# Patient Record
Sex: FEMALE | Race: WHITE | NOT HISPANIC OR LATINO
[De-identification: names, ages, dates, MRNs, and addresses within clinical notes are randomized per-mention and may not be internally consistent; named-entity substitution may affect disease eponyms.]

---

## 2019-08-26 ENCOUNTER — FORM ENCOUNTER (OUTPATIENT)
Age: 74
End: 2019-08-26

## 2019-09-10 ENCOUNTER — FORM ENCOUNTER (OUTPATIENT)
Age: 74
End: 2019-09-10

## 2020-09-22 ENCOUNTER — FORM ENCOUNTER (OUTPATIENT)
Age: 75
End: 2020-09-22

## 2021-09-03 PROBLEM — Z00.00 ENCOUNTER FOR PREVENTIVE HEALTH EXAMINATION: Status: ACTIVE | Noted: 2021-09-03

## 2021-09-24 DIAGNOSIS — Z80.1 FAMILY HISTORY OF MALIGNANT NEOPLASM OF TRACHEA, BRONCHUS AND LUNG: ICD-10-CM

## 2021-09-24 DIAGNOSIS — Z86.79 PERSONAL HISTORY OF OTHER DISEASES OF THE CIRCULATORY SYSTEM: ICD-10-CM

## 2021-09-24 DIAGNOSIS — Z80.3 FAMILY HISTORY OF MALIGNANT NEOPLASM OF BREAST: ICD-10-CM

## 2021-09-24 DIAGNOSIS — Z85.3 PERSONAL HISTORY OF MALIGNANT NEOPLASM OF BREAST: ICD-10-CM

## 2021-09-27 ENCOUNTER — NON-APPOINTMENT (OUTPATIENT)
Age: 76
End: 2021-09-27

## 2021-09-27 ENCOUNTER — APPOINTMENT (OUTPATIENT)
Dept: BREAST CENTER | Facility: CLINIC | Age: 76
End: 2021-09-27
Payer: MEDICARE

## 2021-09-27 VITALS
BODY MASS INDEX: 26.31 KG/M2 | HEIGHT: 60 IN | DIASTOLIC BLOOD PRESSURE: 88 MMHG | HEART RATE: 60 BPM | OXYGEN SATURATION: 98 % | WEIGHT: 134 LBS | SYSTOLIC BLOOD PRESSURE: 157 MMHG

## 2021-09-27 PROCEDURE — 99213 OFFICE O/P EST LOW 20 MIN: CPT

## 2021-09-27 NOTE — PHYSICAL EXAM
[Normocephalic] : normocephalic [Atraumatic] : atraumatic [EOMI] : extra ocular movement intact [Supple] : supple [No Supraclavicular Adenopathy] : no supraclavicular adenopathy [No Cervical Adenopathy] : no cervical adenopathy [Examined in the supine and seated position] : examined in the supine and seated position [No dominant masses] : no dominant masses in right breast  [No dominant masses] : no dominant masses left breast [Breast Mass Right Breast ___cm] : no masses [Breast Mass Left Breast ___cm] : no masses [No Axillary Lymphadenopathy] : no left axillary lymphadenopathy [No Edema] : no edema [No Rashes] : no rashes [No Ulceration] : no ulceration [de-identified] : On exam, the patient has obvious bilateral total mastectomies with implant reconstructions.  She has undergone bilateral nipple reconstructions and tattooing.  I cannot feel any suspicious densities over either implant.  She has no axillary, supraclavicular, or cervical adenopathy. [de-identified] : Status post total mastectomy with implant reconstruction with no evidence of recurrence. [de-identified] : Status post total mastectomy with implant reconstruction with no suspicious findings.

## 2021-09-27 NOTE — REASON FOR VISIT
[Follow-Up: _____] : a [unfilled] follow-up visit [FreeTextEntry1] : The patient comes in with a family history of breast cancer and a personal history of undergoing bilateral mastectomies with bilateral implant reconstructions in May 2012 for 4 mm moderately differentiated right breast cancer with 1 negative sentinel lymph node which was ER/[place] positive HER-2/donald negative making that a pathologic prognostic stage IA breast cancer.  The left mastectomy was prophylactic but she had an incidental retroareolar area of DCIS which was found in the nipple and it was taken.  She was placed on Arimidex for 5 years and comes in for routine yearly exams.

## 2021-09-27 NOTE — PAST MEDICAL HISTORY
[Menarche Age ____] : age at menarche was [unfilled] [Postmenopausal] : The patient is postmenopausal [Menopause Age____] : age at menopause was [unfilled] [Total Preg ___] : G[unfilled] [Live Births ___] : P[unfilled]  [Age At Live Birth ___] : Age at live birth: [unfilled] [History of Hormone Replacement Treatment] : has no history of hormone replacement treatment

## 2021-09-27 NOTE — HISTORY OF PRESENT ILLNESS
[FreeTextEntry1] : The patient is a 76-year-old G3, P3 postmenopausal white female of [place] descent.  She underwent menarche at age 11 and had her first child at age 22.  She underwent menopause in her 50s and never took any hormone replacement therapy.  She has a family history with her mother who had breast cancer at age 70 and then later developed metastatic disease at age 85.  Her sister developed breast cancer at age 62.  Her father had lung cancer at age 72.  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  This right breast cancer was a pathologic prognostic stage IA cancer.  She was initially planned to have a nipple sparing mastectomy on the left but was found to have an incidental DCIS on the left side on the retroareolar biopsy and the nipples were taken on both sides.  She had bilateral implant reconstructions and later nipple reconstructions and tattooing.  She had 1 negative axillary sentinel lymph node on the right.  She had nipple reconstructions performed in October 2012.  She received no adjuvant chemotherapy and took Arimidex for 5 years and comes in now for routine yearly follow-up.

## 2021-09-27 NOTE — ASSESSMENT
[FreeTextEntry1] : The patient is a 76-year-old G3, P3 postmenopausal white female of [place] descent.  She underwent menarche at age 11 and had her first child at age 22.  She underwent menopause in her 50s and never took any hormone replacement therapy.  She has a family history with her mother who had breast cancer at age 70 and then later developed metastatic disease at age 85.  Her sister developed breast cancer at age 62.  Her father had lung cancer at age 72.  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  This right breast cancer was a pathologic prognostic stage IA cancer.  She was initially planned to have a nipple sparing mastectomy on the left but was found to have an incidental DCIS on the left side on the retroareolar biopsy and the nipples were taken on both sides.  She had bilateral implant reconstructions and later nipple reconstructions and tattooing.  She had 1 negative axillary sentinel lymph node on the right.  She had nipple reconstructions performed in October 2012.  She received no adjuvant chemotherapy and took Arimidex for 5 years.  On exam today, I cannot feel any suspicious densities over either implant.   The patient was reassured and should follow-up again in 1 year in September 2022.

## 2022-10-06 ENCOUNTER — NON-APPOINTMENT (OUTPATIENT)
Age: 77
End: 2022-10-06

## 2022-10-06 ENCOUNTER — APPOINTMENT (OUTPATIENT)
Dept: BREAST CENTER | Facility: CLINIC | Age: 77
End: 2022-10-06

## 2022-10-06 VITALS
SYSTOLIC BLOOD PRESSURE: 146 MMHG | OXYGEN SATURATION: 99 % | BODY MASS INDEX: 26.37 KG/M2 | WEIGHT: 135 LBS | HEART RATE: 68 BPM | DIASTOLIC BLOOD PRESSURE: 77 MMHG

## 2022-10-06 PROCEDURE — 99213 OFFICE O/P EST LOW 20 MIN: CPT

## 2022-10-06 NOTE — REASON FOR VISIT
[Follow-Up: _____] : a [unfilled] follow-up visit [FreeTextEntry1] : The patient comes in with a family history of breast cancer and a personal history of undergoing bilateral mastectomies with bilateral implant reconstructions in May 2012 for 4 mm moderately differentiated right breast cancer with 1 negative sentinel lymph node which was ER/[place] positive HER-2/donald negative making that a pathologic prognostic stage IA breast cancer.  The left mastectomy was prophylactic but she had an incidental retroareolar area of DCIS which was found in the nipple and it was taken.  She was placed on Arimidex for 5 years and comes in for routine yearly exams.

## 2022-10-06 NOTE — ASSESSMENT
[FreeTextEntry1] : The patient is a 77-year-old G3, P3 postmenopausal white female of [place] descent.  She underwent menarche at age 11 and had her first child at age 22.  She underwent menopause in her 50s and never took any hormone replacement therapy.  She has a family history with her mother who had breast cancer at age 70 and then later developed metastatic disease at age 85.  Her sister developed breast cancer at age 62.  Her father had lung cancer at age 72.  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  This right breast cancer was a pathologic prognostic stage IA cancer.  She was initially planned to have a nipple sparing mastectomy on the left but was found to have an incidental DCIS on the left side on the retroareolar biopsy and the nipples were taken on both sides.  She had bilateral implant reconstructions and later nipple reconstructions and tattooing.  She had 1 negative axillary sentinel lymph node on the right.  She had nipple reconstructions performed in October 2012.  She received no adjuvant chemotherapy and took Arimidex for 5 years.  On exam today, I cannot feel any suspicious densities over either implant.   The patient was reassured and should follow-up again in 1 year in October 2023.

## 2022-10-06 NOTE — PHYSICAL EXAM
[Normocephalic] : normocephalic [Atraumatic] : atraumatic [EOMI] : extra ocular movement intact [Supple] : supple [No Supraclavicular Adenopathy] : no supraclavicular adenopathy [No Cervical Adenopathy] : no cervical adenopathy [Examined in the supine and seated position] : examined in the supine and seated position [No dominant masses] : no dominant masses in right breast  [No dominant masses] : no dominant masses left breast [Breast Mass Right Breast ___cm] : no masses [Breast Mass Left Breast ___cm] : no masses [No Axillary Lymphadenopathy] : no left axillary lymphadenopathy [No Edema] : no edema [No Rashes] : no rashes [No Ulceration] : no ulceration [de-identified] : On exam, the patient has obvious bilateral total mastectomies with implant reconstructions.  She has undergone bilateral nipple reconstructions and tattooing.  I cannot feel any suspicious densities over either implant.  She has no axillary, supraclavicular, or cervical adenopathy. [de-identified] : Status post total mastectomy with implant reconstruction with no evidence of recurrence. [de-identified] : Status post total mastectomy with implant reconstruction with no suspicious findings.

## 2022-10-06 NOTE — HISTORY OF PRESENT ILLNESS
[FreeTextEntry1] : The patient is a 77-year-old G3, P3 postmenopausal white female of [place] descent.  She underwent menarche at age 11 and had her first child at age 22.  She underwent menopause in her 50s and never took any hormone replacement therapy.  She has a family history with her mother who had breast cancer at age 70 and then later developed metastatic disease at age 85.  Her sister developed breast cancer at age 62.  Her father had lung cancer at age 72.  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  This right breast cancer was a pathologic prognostic stage IA cancer.  She was initially planned to have a nipple sparing mastectomy on the left but was found to have an incidental DCIS on the left side on the retroareolar biopsy and the nipples were taken on both sides.  She had bilateral implant reconstructions and later nipple reconstructions and tattooing.  She had 1 negative axillary sentinel lymph node on the right.  She had nipple reconstructions performed in October 2012.  She received no adjuvant chemotherapy and took Arimidex for 5 years and comes in now for routine yearly follow-up.

## 2023-10-03 ENCOUNTER — NON-APPOINTMENT (OUTPATIENT)
Age: 78
End: 2023-10-03

## 2023-10-11 ENCOUNTER — APPOINTMENT (OUTPATIENT)
Dept: BREAST CENTER | Facility: CLINIC | Age: 78
End: 2023-10-11
Payer: MEDICARE

## 2023-10-11 VITALS
SYSTOLIC BLOOD PRESSURE: 126 MMHG | BODY MASS INDEX: 25.91 KG/M2 | DIASTOLIC BLOOD PRESSURE: 76 MMHG | HEART RATE: 73 BPM | OXYGEN SATURATION: 97 % | WEIGHT: 132 LBS | HEIGHT: 60 IN

## 2023-10-11 DIAGNOSIS — Z80.3 FAMILY HISTORY OF MALIGNANT NEOPLASM OF BREAST: ICD-10-CM

## 2023-10-11 DIAGNOSIS — Z85.3 PERSONAL HISTORY OF MALIGNANT NEOPLASM OF BREAST: ICD-10-CM

## 2023-10-11 DIAGNOSIS — Z90.13 ACQUIRED ABSENCE OF BILATERAL BREASTS AND NIPPLES: ICD-10-CM

## 2023-10-11 PROCEDURE — 99212 OFFICE O/P EST SF 10 MIN: CPT

## 2024-09-25 ENCOUNTER — NON-APPOINTMENT (OUTPATIENT)
Age: 79
End: 2024-09-25

## 2024-09-25 NOTE — HISTORY OF PRESENT ILLNESS
[FreeTextEntry1] : The patient is a 79-year-old G3, P3 postmenopausal white female of [place] descent.  She underwent menarche at age 11 and had her first child at age 22.  She underwent menopause in her 50s and never took any hormone replacement therapy.  She has a family history with her mother who had breast cancer at age 70 and then later developed metastatic disease at age 85.  Her sister developed breast cancer at age 62.  Her father had lung cancer at age 72.  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  This right breast cancer was a pathologic prognostic stage IA cancer.  She was initially planned to have a nipple sparing mastectomy on the left but was found to have an incidental DCIS on the left side on the retroareolar biopsy and the nipples were taken on both sides.  She had bilateral implant reconstructions and later nipple reconstructions and tattooing.  She had 1 negative axillary sentinel lymph node on the right.  She had nipple reconstructions performed in October 2012.  She received no adjuvant chemotherapy and took Arimidex for 5 years and comes in now for routine yearly follow-up.

## 2024-09-25 NOTE — ASSESSMENT
[FreeTextEntry1] : The patient is a 79-year-old G3, P3 postmenopausal white female of [place] descent.  She underwent menarche at age 11 and had her first child at age 22.  She underwent menopause in her 50s and never took any hormone replacement therapy.  She has a family history with her mother who had breast cancer at age 70 and then later developed metastatic disease at age 85.  Her sister developed breast cancer at age 62.  Her father had lung cancer at age 72.  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  This right breast cancer was a pathologic prognostic stage IA cancer.  She was initially planned to have a nipple sparing mastectomy on the left but was found to have an incidental DCIS on the left side on the retroareolar biopsy and the nipples were taken on both sides.  She had bilateral implant reconstructions and later nipple reconstructions and tattooing.  She had 1 negative axillary sentinel lymph node on the right.  She had nipple reconstructions performed in October 2012.  She received no adjuvant chemotherapy and took Arimidex for 5 years.  On exam today, I cannot feel any suspicious densities over either implant.   The patient was reassured and should follow-up again in 1 year in October 2025.

## 2024-09-25 NOTE — PHYSICAL EXAM
[Normocephalic] : normocephalic [Atraumatic] : atraumatic [EOMI] : extra ocular movement intact [Supple] : supple [No Supraclavicular Adenopathy] : no supraclavicular adenopathy [No Cervical Adenopathy] : no cervical adenopathy [Examined in the supine and seated position] : examined in the supine and seated position [No dominant masses] : no dominant masses in right breast  [No dominant masses] : no dominant masses left breast [Breast Mass Right Breast ___cm] : no masses [Breast Mass Left Breast ___cm] : no masses [No Axillary Lymphadenopathy] : no left axillary lymphadenopathy [No Edema] : no edema [No Rashes] : no rashes [No Ulceration] : no ulceration [de-identified] : On exam, the patient has obvious bilateral total mastectomies with implant reconstructions.  She has undergone bilateral nipple reconstructions and tattooing.  I cannot feel any suspicious densities over either implant.  She has no axillary, supraclavicular, or cervical adenopathy. [de-identified] : Status post total mastectomy with implant reconstruction with no evidence of recurrence. [de-identified] : Status post total mastectomy with implant reconstruction with no suspicious findings.

## 2024-09-25 NOTE — REASON FOR VISIT
[Follow-Up: _____] : a [unfilled] follow-up visit [FreeTextEntry1] : The patient comes in with a family history of breast cancer and a personal history of undergoing bilateral mastectomies with bilateral implant reconstructions in May 2012 for 4 mm moderately differentiated right breast cancer with 1 negative sentinel lymph node which was ER/[place] positive HER-2/donald negative making that a pathologic prognostic stage IA breast cancer.  The left mastectomy was prophylactic but she had an incidental retroareolar area of DCIS which was found in the nipple and it was taken.  She was placed on Arimidex for 5 years and comes in for routine yearly exams. [FreeTextEntry2] : May 24, 2012

## 2024-10-17 ENCOUNTER — APPOINTMENT (OUTPATIENT)
Dept: BREAST CENTER | Facility: CLINIC | Age: 79
End: 2024-10-17
Payer: MEDICARE

## 2024-10-17 VITALS
OXYGEN SATURATION: 99 % | DIASTOLIC BLOOD PRESSURE: 71 MMHG | WEIGHT: 130 LBS | BODY MASS INDEX: 25.52 KG/M2 | SYSTOLIC BLOOD PRESSURE: 126 MMHG | HEART RATE: 72 BPM | HEIGHT: 60 IN

## 2024-10-17 DIAGNOSIS — Z80.3 FAMILY HISTORY OF MALIGNANT NEOPLASM OF BREAST: ICD-10-CM

## 2024-10-17 DIAGNOSIS — Z90.13 ACQUIRED ABSENCE OF BILATERAL BREASTS AND NIPPLES: ICD-10-CM

## 2024-10-17 DIAGNOSIS — Z85.3 PERSONAL HISTORY OF MALIGNANT NEOPLASM OF BREAST: ICD-10-CM

## 2024-10-17 DIAGNOSIS — Z12.39 ENCOUNTER FOR OTHER SCREENING FOR MALIGNANT NEOPLASM OF BREAST: ICD-10-CM

## 2024-10-17 PROCEDURE — 99213 OFFICE O/P EST LOW 20 MIN: CPT

## 2025-08-27 ENCOUNTER — NON-APPOINTMENT (OUTPATIENT)
Age: 80
End: 2025-08-27